# Patient Record
Sex: FEMALE | Race: WHITE
[De-identification: names, ages, dates, MRNs, and addresses within clinical notes are randomized per-mention and may not be internally consistent; named-entity substitution may affect disease eponyms.]

---

## 2018-11-02 ENCOUNTER — HOSPITAL ENCOUNTER (OUTPATIENT)
Dept: HOSPITAL 38 - CC.SDS | Age: 53
End: 2018-11-02
Attending: FAMILY MEDICINE
Payer: COMMERCIAL

## 2018-11-02 VITALS — DIASTOLIC BLOOD PRESSURE: 68 MMHG | SYSTOLIC BLOOD PRESSURE: 102 MMHG

## 2018-11-02 DIAGNOSIS — Z80.0: ICD-10-CM

## 2018-11-02 DIAGNOSIS — Z12.11: Primary | ICD-10-CM

## 2018-11-02 DIAGNOSIS — Z79.899: ICD-10-CM

## 2018-11-02 PROCEDURE — 45378 DIAGNOSTIC COLONOSCOPY: CPT

## 2018-11-02 PROCEDURE — 36415 COLL VENOUS BLD VENIPUNCTURE: CPT

## 2018-11-02 PROCEDURE — 84703 CHORIONIC GONADOTROPIN ASSAY: CPT

## 2018-11-05 NOTE — OR
DATE OF OPERATION:  2018

 

PREOPERATIVE DIAGNOSIS:  FAMILY HISTORY OF COLON CANCER.

 

POSTOPERATIVE DIAGNOSIS:  FAMILY HISTORY OF COLON CANCER.

 

SURGEON:  Willie Paul MD

 

PROCEDURE:  FULL-LENGTH COLONOSCOPY.

 

ANESTHESIA:  CRNA.

 

COMPLICATIONS:  None.

 

SPECIMEN:  None.

 

FINDINGS:  Normal full-length colonoscopy.

 

RECOMMENDATIONS:  Follow up colonoscopy every 5 years.

 

INDICATIONS:  The patient's father  of colon cancer.  She is having her

first endoscopy.

 

DESCRIPTION OF PROCEDURE:  The patient was prepped and draped, placed in left

lateral decubitus position.  A lubricated Olympus colonoscope was inserted and

with relative ease advanced to the cecum.  The patient is slightly tortuous

through the transverse colon.  We were able to directly visualize the ileocecal

valve, cecal pouch was filled with stools.  It was hard to clear that

completely, and I believe we were able to see the appendiceal orifice.  Upon

withdrawal, throughout the entire length of the colon, I could find no signs of

any polyps, mass, ulceration, or bleeding sites.  No vascular abnormalities or

signs of colitis.  There were no diverticula.  The rectal vault was benign.

Retroflexion of scope in the

rectum showed no anal lesions.  Air was suctioned.  The scope was removed

without complication.

 

CORI/MACK

DD:  2018 12:35:46

DT:  2018 20:29:19

Job #:  [id number]

## 2024-11-15 ENCOUNTER — HOSPITAL ENCOUNTER (OUTPATIENT)
Dept: HOSPITAL 38 - CC.SDS | Age: 59
Discharge: HOME | End: 2024-11-15
Attending: FAMILY MEDICINE
Payer: COMMERCIAL

## 2024-11-15 VITALS — HEART RATE: 55 BPM | SYSTOLIC BLOOD PRESSURE: 101 MMHG | DIASTOLIC BLOOD PRESSURE: 69 MMHG

## 2024-11-15 DIAGNOSIS — Z79.899: ICD-10-CM

## 2024-11-15 DIAGNOSIS — Z80.0: ICD-10-CM

## 2024-11-15 DIAGNOSIS — E78.5: ICD-10-CM

## 2024-11-15 DIAGNOSIS — Z12.11: Primary | ICD-10-CM
